# Patient Record
Sex: MALE | Race: WHITE | NOT HISPANIC OR LATINO | Employment: FULL TIME | URBAN - METROPOLITAN AREA
[De-identification: names, ages, dates, MRNs, and addresses within clinical notes are randomized per-mention and may not be internally consistent; named-entity substitution may affect disease eponyms.]

---

## 2024-02-28 ENCOUNTER — TELEPHONE (OUTPATIENT)
Dept: UROLOGY | Facility: CLINIC | Age: 49
End: 2024-02-28
Payer: COMMERCIAL

## 2024-02-28 NOTE — TELEPHONE ENCOUNTER
Spoke with pt regarding vasectomy consult. Pt wanted to schedule an appt. Pt verbalized agreement to appt scheduled on 04/02/2024 at 9am

## 2024-02-28 NOTE — TELEPHONE ENCOUNTER
----- Message from Naomi Bates sent at 2/28/2024  2:40 PM CST -----  Contact: self  Type:  Patient Returning Call    Who Called:Dev Ruvalcaba  Who Left Message for Patient:unsure  Does the patient know what this is regarding?:vasectomy consult  Would the patient rather a call back or a response via [facility]?   Best Call Back Number:[phone]  Additional Information: n/a